# Patient Record
Sex: FEMALE | Race: WHITE | NOT HISPANIC OR LATINO | URBAN - METROPOLITAN AREA
[De-identification: names, ages, dates, MRNs, and addresses within clinical notes are randomized per-mention and may not be internally consistent; named-entity substitution may affect disease eponyms.]

---
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
## 2020-10-08 ENCOUNTER — EMERGENCY (EMERGENCY)
Facility: HOSPITAL | Age: 20
LOS: 1 days | Discharge: HOME | End: 2020-10-08
Attending: EMERGENCY MEDICINE | Admitting: EMERGENCY MEDICINE
Payer: COMMERCIAL

## 2020-10-08 VITALS
TEMPERATURE: 98 F | OXYGEN SATURATION: 100 % | DIASTOLIC BLOOD PRESSURE: 100 MMHG | HEART RATE: 100 BPM | SYSTOLIC BLOOD PRESSURE: 133 MMHG | RESPIRATION RATE: 22 BRPM

## 2020-10-08 VITALS — HEART RATE: 106 BPM | OXYGEN SATURATION: 99 %

## 2020-10-08 PROCEDURE — 99283 EMERGENCY DEPT VISIT LOW MDM: CPT

## 2020-10-08 NOTE — ED PROVIDER NOTE - PROGRESS NOTE DETAILS
PGY3/MD Delmer. SpO2 99% at ambulation, 100% in the bed. not hypoxic, clear lung sound, strict return precaution was given. given COVID pandemic and increasing number of admitted cases (including healthcare providers in the hospital), outpatient management is likely favored in risk/benefit analysis.  d/w patient to this effect.

## 2020-10-08 NOTE — ED PROVIDER NOTE - OBJECTIVE STATEMENT
PGY3/MD Delmer. 21 yo F with panick attack, p/w SOB, pt was tested COVID 5d ago, turned to positive. Pt had group recliation 10d ago, one of the frineds tested positve, started symptoms, fever, myaligia and cough, sneeze, 5d ago. Denies chest pain or tight ness. No OCP use or h/o DVT or PE. Non smoker, no h/o asthma.

## 2020-10-08 NOTE — ED PROVIDER NOTE - CARE PLAN
Pending Prescriptions:                       Disp   Refills    glimepiride (AMARYL) 4 MG tablet [Pharmac*180 ta*0            Sig: TAKE 1 TABLET TWICE A DAY WITH MEALS (BREAKFAST           AND DINNER)    Plan from last office visit - f/u Feb/March for preop - HGB A1C due 3 months from 10/22/18 - this is due now, ordered as future     Routing refill request to provider for review/approval because:  BP not in range within 6 months   BP Readings from Last 6 Encounters:   10/22/18 128/77   09/06/18 (!) 136/92   08/13/18 131/72   06/25/18 120/69   05/23/18 128/73   04/20/18 130/66     Routing to  to schedule lab only appointment     Gosia Grace Registered Nurse   [facility]              Principal Discharge DX:	Coronavirus infection

## 2020-10-08 NOTE — ED PROVIDER NOTE - ATTENDING CONTRIBUTION TO CARE
agree with resident note    "21 yo F with panick attack, p/w SOB, pt was tested COVID 5d ago, turned to positive. Pt had group recliation 10d ago, one of the frineds tested positve, started symptoms, fever, myaligia and cough, sneeze, 5d ago."    PE: well appearing but crying; VSS; not hypoxic; 100% on RA; CTAB/L; s1 s2 no m/r/g abd soft/NT/ND ext: no edema    Imp: COVID+; not hypoxic no symptoms; will d/c home with instructions to return for shortness of breath or any other symptoms in which she feels she needs to return

## 2020-10-08 NOTE — ED ADULT TRIAGE NOTE - CHIEF COMPLAINT QUOTE
pt c/o SOB x2 hours with chest tightness xfew days. pt tachypneic and uncomfortable in triage. SPO2 100% on room air, placed on supplemental O2 for comfort.  tested for COVID on 10/3, with positive result on 10/5 at [facility].

## 2020-10-08 NOTE — ED PROVIDER NOTE - NSFOLLOWUPINSTRUCTIONS_ED_ALL_ED_FT
At this time, you do not meet our hospital's criteria for coronavirus testing and we are unable to test you specifically for the COVID-19 virus that is causing many infections around the world; however, we still recommend that you stay home and self-quarantine (avoid contact with other people) for the next 2 weeks.      We recommend that you:  1. Continue your home medications as prescribed.  2. Take Tylenol, 2 extra strength tablets, up to every 6 hours as needed for pain or any fever.  3. Drink plenty of fluids.  4. Call your primary care doctor tomorrow for follow-up.  5. Avoid contact with people and self-quarantine at home for the next 2-weeks.    *** Return immediately (or call [phone]) if you have increased difficulty breathing, vomiting, chest pain, or develop other new/concerning symptoms. ***    YOU WERE SEEN IN THE ED FOR A LIKELY VIRAL ILLNESS. WE CANNOT PERFORM DEFINITIVE TESTING AT THIS TIME FOR THE NOVEL CORONAVIRUS.    YOU SHOULD SELF-QUARANTINE FOR 14 DAYS TO AVOID POTENTIAL SPREAD OF THIS VIRUS.    For outpatient coronavirus testing centers you may contact the following resources:  -[place] Novel Coronavirus 24/7 Hotline: (307)-957-3198  -[facility]. To locate you local center: [employer]  -[facility]: [phone]  -[facility] testing centers, including [facility] testing center: call [phone] for an appointment.    What is a coronavirus?  Coronaviruses are a large family of viruses that cause illnesses ranging from the common cold to more severe diseases such as Middle East Respiratory Syndrome (MERS) and Severe Acute Respiratory Syndrome (SARS).    What is Novel Coronavirus (COVID-19)?  The Centers for Disease Control and Prevention (CDC) is closely monitoring the outbreak caused by COVID-19. For the latest information about COVID-19, visit the CDC website at CDC.gov/Coronavirus    How are coronaviruses spread?  Coronaviruses can be transmitted from person-toperson, usually after close contact with an infected  person (for example, in a household, workplace, or healthcare setting), via droplets that become airborne after a cough or sneeze. These droplets can then infect a nearby person. Transmission can also occur by touching recently contaminated surfaces.    Is there a treatment for a COVID-19?  There is no specific treatment for disease caused by COVID-19. However, many of the symptoms can be treated based on the patient’s clinical condition. Supportive care for infected persons can be highly effective.    What are the symptoms of coronavirus infection?  It depends on the virus, but common signs include fever and/or respiratory symptoms such as cough and shortness of breath. In more severe cases, infection can cause pneumonia, severe acute respiratory syndrome, kidney failure and even death. Fortunately, most cases of COVID-19 have an illness no different than the influenza (flu), with a majority of these patients having mild symptoms and overall mortality which appears to be not much different than the flu.    What can I do to protect myself?  The best precautionary measures:  – washing your hands  – covering your cough  – disinfecting surfaces  – it is also advisable to avoid close contact with anyone showing symptoms of respiratory illness such as coughing and sneezing  – those with symptoms should wear a surgical mask when around others    What can I do to protect those around me?  If you have been identified as someone who may be infected with COVID-19, we recommend you follow the self-isolation procedures outlined on the following page to protect those around you and to limit the spread of this virus.    We recommend the below precautionary steps from now until 14 days from when you returned from your travel or date of your last known possible contact:    — Do not go to work, school or public areas. Avoid using public transportation, ridesharing or taxis.  — As much as possible, separate yourself from other people in your home. If you can, you should stay in a room and away from other people. Also, you should use a separate bathroom if available.  — Wear the supplied mask whenever you are around other people.  — If you have a non-urgent medical appointment, please reschedule for a later date. If the appointment is urgent, please call the health care provider and tell them that you are on self-isolation for possible COVID-19. This will help the health care provider’s office take steps to keep other people from getting infected or exposed. If you can reschedule routine appointments, do so.  — Wash your hands often with soap and water for at least 15 to 20 seconds or clean your hands with an alcohol-based hand  that contains 60 to 95% alcohol, covering all surfaces of your hands and rubbing them together until they feel dry. Soap and water should be used preferentially if hands are visibly dirty.  — Cover your mouth and nose with a tissue when you cough or sneeze. Throw used tissues in a lined trash can. Immediately wash your hands.  — Avoid touching your eyes, nose, and mouth with your hands.  — Avoid sharing personal household items. You should not share dishes, drinking glasses, cups, eating utensils, towels, or bedding with other people or pets in your home. After using these items, they should be washed thoroughly with soap and water.  — Clean and disinfect all “high-touch”surfaces every day. High touch surfaces include counters, tabletops, doorknobs, light switches, remote controls, bathroom fixtures, toilets, phones, keyboards, tablets, and bedside tables. Also, clean any surfaces that may have blood, stool, or body fluids on them.

## 2020-10-08 NOTE — ED PROVIDER NOTE - PHYSICAL EXAMINATION
PGY3/MD Delmer.   VITALS: reviewed  GEN: No apparent distress, A & O x 4  HEAD/EYES: NC/AT, anicteric sclerae, no conjunctival pallor  ENT: mucus membranes moist, oropharynx WNL, trachea midline, no JVD, neck is supple  RESP: lungs CTA with equal breath sounds bilaterally, chest wall nontender and atraumatic  CV: tachy; distal pulses intact and symmetric bilaterally  ABDOMEN: normoactive bowel sounds, soft, nondistended, nontender  MSK: extremities atraumatic and nontender, no edema, no asymmetry  SKIN: warm, dry, no rash, no bruising, no cyanosis. color appropriate for ethnicity  NEURO: alert, mentating appropriately, no facial asymmetry.  PSYCH: Affect appropriate

## 2020-10-09 PROBLEM — G43.909 MIGRAINE, UNSPECIFIED, NOT INTRACTABLE, WITHOUT STATUS MIGRAINOSUS: Chronic | Status: ACTIVE | Noted: 2017-04-06

## 2020-10-09 NOTE — ED ADULT NURSE NOTE - CHIEF COMPLAINT QUOTE
pt c/o SOB x2 hours with chest tightness xfew days. pt tachypneic and uncomfortable in triage. SPO2 100% on room air, placed on supplemental O2 for comfort.  tested for COVID on 10/3, with positive result on 10/5 at [facility].

## 2020-10-09 NOTE — ED ADULT NURSE NOTE - OBJECTIVE STATEMENT
21 y/o F received to room 9 c/o sob. Pt a&ox4 and ambulatory. pt presents to ed c/o 5 days of fatigue fevers, and cough. Pt respirations even and unlabored. Pt appears to be resting comfortably sating 100% on RA. Pt COVID positive 5 days ago. Vital signs as noted, call bell in reach, comfort measures provided, will continue to monitor.

## 2020-10-09 NOTE — ED ADULT NURSE NOTE - NSIMPLEMENTINTERV_GEN_ALL_ED
Implemented All Universal Safety Interventions:  [place] to call system. Call bell, personal items and telephone within reach. Instruct patient to call for assistance. Room bathroom lighting operational. Non-slip footwear when patient is off stretcher. Physically safe environment: no spills, clutter or unnecessary equipment. Stretcher in lowest position, wheels locked, appropriate side rails in place.

## 2021-12-22 ENCOUNTER — RESULT REVIEW (OUTPATIENT)
Age: 21
End: 2021-12-22

## 2023-09-12 NOTE — ED ADULT NURSE NOTE - CAS DISCH CONDITION
Last office visit: 06/26/23    Upcoming scheduled visit: 11/06/23    Last refill given: hydroCHLOROthiazide (HYDRODIURIL) 25 MG tablet 09/21/23      Medication refill request approved per protocol / Medication refill request forwarded to MD for approval     Stable

## 2024-12-06 ENCOUNTER — NON-APPOINTMENT (OUTPATIENT)
Age: 24
End: 2024-12-06

## 2024-12-06 DIAGNOSIS — Z98.890 OTHER SPECIFIED POSTPROCEDURAL STATES: ICD-10-CM

## 2024-12-09 ENCOUNTER — APPOINTMENT (OUTPATIENT)
Facility: CLINIC | Age: 24
End: 2024-12-09

## 2024-12-09 VITALS — SYSTOLIC BLOOD PRESSURE: 122 MMHG | DIASTOLIC BLOOD PRESSURE: 70 MMHG

## 2024-12-09 LAB — HCG UR QL: NEGATIVE

## 2024-12-09 PROCEDURE — 99459 PELVIC EXAMINATION: CPT

## 2024-12-09 PROCEDURE — 99395 PREV VISIT EST AGE 18-39: CPT

## 2024-12-09 PROCEDURE — 81025 URINE PREGNANCY TEST: CPT

## 2024-12-09 RX ORDER — NORETHINDRONE ACETATE AND ETHINYL ESTRADIOL 1MG-20(21)
1-20 KIT ORAL
Qty: 3 | Refills: 1 | Status: ACTIVE | COMMUNITY
Start: 2024-12-09 | End: 1900-01-01

## 2024-12-10 LAB
BV BACTERIA RRNA VAG QL NAA+PROBE: NOT DETECTED
C GLABRATA RNA VAG QL NAA+PROBE: NOT DETECTED
CANDIDA RRNA VAG QL PROBE: NOT DETECTED
T VAGINALIS RRNA SPEC QL NAA+PROBE: NOT DETECTED

## 2024-12-11 LAB
C TRACH RRNA SPEC QL NAA+PROBE: NOT DETECTED
N GONORRHOEA RRNA SPEC QL NAA+PROBE: NOT DETECTED
SOURCE AMPLIFICATION: NORMAL

## 2024-12-15 LAB — CYTOLOGY CVX/VAG DOC THIN PREP: NORMAL

## 2025-06-09 ENCOUNTER — APPOINTMENT (OUTPATIENT)
Facility: CLINIC | Age: 25
End: 2025-06-09
Payer: COMMERCIAL

## 2025-06-09 VITALS — DIASTOLIC BLOOD PRESSURE: 88 MMHG | SYSTOLIC BLOOD PRESSURE: 131 MMHG

## 2025-06-09 LAB — HCG UR QL: NEGATIVE

## 2025-06-09 PROCEDURE — 99459 PELVIC EXAMINATION: CPT

## 2025-06-09 PROCEDURE — 81025 URINE PREGNANCY TEST: CPT

## 2025-06-09 PROCEDURE — 99203 OFFICE O/P NEW LOW 30 MIN: CPT

## 2025-06-27 ENCOUNTER — RX RENEWAL (OUTPATIENT)
Age: 25
End: 2025-06-27